# Patient Record
Sex: FEMALE | Race: WHITE | NOT HISPANIC OR LATINO | URBAN - METROPOLITAN AREA
[De-identification: names, ages, dates, MRNs, and addresses within clinical notes are randomized per-mention and may not be internally consistent; named-entity substitution may affect disease eponyms.]

---

## 2017-10-24 ENCOUNTER — APPOINTMENT (OUTPATIENT)
Dept: URBAN - METROPOLITAN AREA CLINIC 200 | Age: 62
Setting detail: DERMATOLOGY
End: 2017-10-26

## 2017-10-24 DIAGNOSIS — L57.8 OTHER SKIN CHANGES DUE TO CHRONIC EXPOSURE TO NONIONIZING RADIATION: ICD-10-CM

## 2017-10-24 DIAGNOSIS — B35.1 TINEA UNGUIUM: ICD-10-CM

## 2017-10-24 DIAGNOSIS — L29.8 OTHER PRURITUS: ICD-10-CM

## 2017-10-24 PROBLEM — J30.1 ALLERGIC RHINITIS DUE TO POLLEN: Status: ACTIVE | Noted: 2017-10-24

## 2017-10-24 PROBLEM — L20.84 INTRINSIC (ALLERGIC) ECZEMA: Status: ACTIVE | Noted: 2017-10-24

## 2017-10-24 PROBLEM — I10 ESSENTIAL (PRIMARY) HYPERTENSION: Status: ACTIVE | Noted: 2017-10-24

## 2017-10-24 PROBLEM — L29.9 PRURITUS, UNSPECIFIED: Status: ACTIVE | Noted: 2017-10-24

## 2017-10-24 PROCEDURE — 99213 OFFICE O/P EST LOW 20 MIN: CPT

## 2017-10-24 PROCEDURE — OTHER COUNSELING: OTHER

## 2017-10-24 PROCEDURE — OTHER PRESCRIPTION SAMPLES PROVIDED: OTHER

## 2017-10-24 PROCEDURE — OTHER PRESCRIPTION: OTHER

## 2017-10-24 RX ORDER — HYDROCORTISONE 25 MG/ML
LOTION TOPICAL
Qty: 1 | Refills: 7 | Status: ERX

## 2017-10-24 ASSESSMENT — LOCATION SIMPLE DESCRIPTION DERM
LOCATION SIMPLE: RIGHT UPPER BACK
LOCATION SIMPLE: LEFT CHEEK
LOCATION SIMPLE: LEFT GREAT TOE
LOCATION SIMPLE: RIGHT GREAT TOE

## 2017-10-24 ASSESSMENT — LOCATION ZONE DERM
LOCATION ZONE: TOE
LOCATION ZONE: TRUNK
LOCATION ZONE: FACE
LOCATION ZONE: TOENAIL

## 2017-10-24 ASSESSMENT — LOCATION DETAILED DESCRIPTION DERM
LOCATION DETAILED: RIGHT GREAT TOENAIL
LOCATION DETAILED: RIGHT INFERIOR MEDIAL UPPER BACK
LOCATION DETAILED: LEFT DISTAL PLANTAR GREAT TOE
LOCATION DETAILED: LEFT INFERIOR CENTRAL MALAR CHEEK

## 2017-11-08 ENCOUNTER — RX ONLY (RX ONLY)
Age: 62
End: 2017-11-08

## 2017-11-08 RX ORDER — EFINACONAZOLE 100 MG/ML
SOLUTION TOPICAL
Qty: 1 | Refills: 1 | Status: ERX | COMMUNITY
Start: 2017-11-08

## 2018-11-13 ENCOUNTER — RX ONLY (RX ONLY)
Age: 63
End: 2018-11-13

## 2018-11-13 ENCOUNTER — APPOINTMENT (OUTPATIENT)
Dept: URBAN - METROPOLITAN AREA CLINIC 200 | Age: 63
Setting detail: DERMATOLOGY
End: 2018-11-15

## 2018-11-13 DIAGNOSIS — L20.84 INTRINSIC (ALLERGIC) ECZEMA: ICD-10-CM

## 2018-11-13 DIAGNOSIS — L57.8 OTHER SKIN CHANGES DUE TO CHRONIC EXPOSURE TO NONIONIZING RADIATION: ICD-10-CM

## 2018-11-13 PROCEDURE — OTHER MIPS QUALITY: OTHER

## 2018-11-13 PROCEDURE — OTHER COUNSELING: OTHER

## 2018-11-13 PROCEDURE — 99213 OFFICE O/P EST LOW 20 MIN: CPT

## 2018-11-13 PROCEDURE — OTHER PRESCRIPTION: OTHER

## 2018-11-13 RX ORDER — EFINACONAZOLE 100 MG/ML
SOLUTION TOPICAL
Qty: 3 | Refills: 1 | Status: ERX

## 2018-11-13 RX ORDER — TRIAMCINOLONE ACETONIDE 1 MG/G
CREAM TOPICAL
Qty: 1 | Refills: 3 | Status: ERX | COMMUNITY
Start: 2018-11-13

## 2018-11-13 ASSESSMENT — LOCATION ZONE DERM: LOCATION ZONE: TRUNK

## 2018-11-13 ASSESSMENT — LOCATION SIMPLE DESCRIPTION DERM
LOCATION SIMPLE: CHEST
LOCATION SIMPLE: RIGHT UPPER BACK

## 2018-11-13 ASSESSMENT — LOCATION DETAILED DESCRIPTION DERM
LOCATION DETAILED: LEFT MEDIAL SUPERIOR CHEST
LOCATION DETAILED: RIGHT SUPERIOR MEDIAL UPPER BACK

## 2019-12-09 ENCOUNTER — APPOINTMENT (OUTPATIENT)
Dept: URBAN - METROPOLITAN AREA CLINIC 200 | Age: 64
Setting detail: DERMATOLOGY
End: 2019-12-11

## 2019-12-09 DIAGNOSIS — Z12.83 ENCOUNTER FOR SCREENING FOR MALIGNANT NEOPLASM OF SKIN: ICD-10-CM

## 2019-12-09 DIAGNOSIS — L57.8 OTHER SKIN CHANGES DUE TO CHRONIC EXPOSURE TO NONIONIZING RADIATION: ICD-10-CM

## 2019-12-09 PROBLEM — I10 ESSENTIAL (PRIMARY) HYPERTENSION: Status: ACTIVE | Noted: 2019-12-09

## 2019-12-09 PROBLEM — L20.84 INTRINSIC (ALLERGIC) ECZEMA: Status: ACTIVE | Noted: 2019-12-09

## 2019-12-09 PROCEDURE — OTHER COUNSELING: OTHER

## 2019-12-09 PROCEDURE — OTHER MIPS QUALITY: OTHER

## 2019-12-09 PROCEDURE — 99213 OFFICE O/P EST LOW 20 MIN: CPT

## 2019-12-09 ASSESSMENT — LOCATION DETAILED DESCRIPTION DERM
LOCATION DETAILED: LEFT MEDIAL SUPERIOR CHEST
LOCATION DETAILED: LEFT MID-UPPER BACK

## 2019-12-09 ASSESSMENT — LOCATION SIMPLE DESCRIPTION DERM
LOCATION SIMPLE: LEFT UPPER BACK
LOCATION SIMPLE: CHEST

## 2019-12-09 ASSESSMENT — LOCATION ZONE DERM
LOCATION ZONE: TRUNK
LOCATION ZONE: TRUNK

## 2019-12-09 NOTE — PROCEDURE: MIPS QUALITY
Quality 474: Zoster Vaccination Status: Shingrix vaccine was not administered for reasons documented by clinician (e.g. patient administered vaccine other than Shingrix, patient allergy or other medical reasons, patient declined or other patient reasons, vaccine not available or other system reasons)
Detail Level: Detailed
Additional Notes: Shingles SHINGRIX vaccine not administered due to patients personal or medical reasons. Patient declined to elaborate on those reasons.
Quality 110: Preventive Care And Screening: Influenza Immunization: Influenza Immunization previously received during influenza season

## 2020-09-04 ENCOUNTER — HOSPITAL ENCOUNTER (EMERGENCY)
Facility: HOSPITAL | Age: 65
Discharge: HOME | End: 2020-09-04
Attending: EMERGENCY MEDICINE
Payer: COMMERCIAL

## 2020-09-04 VITALS
OXYGEN SATURATION: 96 % | HEART RATE: 104 BPM | TEMPERATURE: 98.4 F | SYSTOLIC BLOOD PRESSURE: 145 MMHG | DIASTOLIC BLOOD PRESSURE: 65 MMHG | RESPIRATION RATE: 18 BRPM | WEIGHT: 143 LBS | BODY MASS INDEX: 26.31 KG/M2 | HEIGHT: 62 IN

## 2020-09-04 DIAGNOSIS — M27.2 ODONTOGENIC INFECTION OF JAW: Primary | ICD-10-CM

## 2020-09-04 LAB
ALBUMIN SERPL-MCNC: 4.1 G/DL (ref 3.4–5)
ALP SERPL-CCNC: 50 IU/L (ref 35–126)
ALT SERPL-CCNC: 14 IU/L (ref 11–54)
ANION GAP SERPL CALC-SCNC: 10 MEQ/L (ref 3–15)
AST SERPL-CCNC: 18 IU/L (ref 15–41)
BASOPHILS # BLD: 0.02 K/UL (ref 0.01–0.1)
BASOPHILS NFR BLD: 0.4 %
BILIRUB SERPL-MCNC: 0.8 MG/DL (ref 0.3–1.2)
BUN SERPL-MCNC: 18 MG/DL (ref 8–20)
CALCIUM SERPL-MCNC: 9.4 MG/DL (ref 8.9–10.3)
CHLORIDE SERPL-SCNC: 106 MEQ/L (ref 98–109)
CO2 SERPL-SCNC: 24 MEQ/L (ref 22–32)
CREAT SERPL-MCNC: 0.7 MG/DL (ref 0.6–1.1)
DIFFERENTIAL METHOD BLD: ABNORMAL
EOSINOPHIL # BLD: 0.03 K/UL (ref 0.04–0.36)
EOSINOPHIL NFR BLD: 0.5 %
ERYTHROCYTE [DISTWIDTH] IN BLOOD BY AUTOMATED COUNT: 13.6 % (ref 11.7–14.4)
GFR SERPL CREATININE-BSD FRML MDRD: >60 ML/MIN/1.73M*2
GLUCOSE SERPL-MCNC: 109 MG/DL (ref 70–99)
HCT VFR BLDCO AUTO: 39.4 % (ref 35–45)
HGB BLD-MCNC: 12.5 G/DL (ref 11.8–15.7)
IMM GRANULOCYTES # BLD AUTO: 0.03 K/UL (ref 0–0.08)
IMM GRANULOCYTES NFR BLD AUTO: 0.5 %
LYMPHOCYTES # BLD: 1.08 K/UL (ref 1.2–3.5)
LYMPHOCYTES NFR BLD: 19.4 %
MCH RBC QN AUTO: 29.3 PG (ref 28–33.2)
MCHC RBC AUTO-ENTMCNC: 31.7 G/DL (ref 32.2–35.5)
MCV RBC AUTO: 92.3 FL (ref 83–98)
MONOCYTES # BLD: 0.41 K/UL (ref 0.28–0.8)
MONOCYTES NFR BLD: 7.4 %
NEUTROPHILS # BLD: 3.99 K/UL (ref 1.7–7)
NEUTS SEG NFR BLD: 71.8 %
NRBC BLD-RTO: 0 %
PDW BLD AUTO: 11.2 FL (ref 9.4–12.3)
PLATELET # BLD AUTO: 184 K/UL (ref 150–369)
POTASSIUM SERPL-SCNC: 3.8 MEQ/L (ref 3.6–5.1)
PROT SERPL-MCNC: 6.8 G/DL (ref 6–8.2)
RBC # BLD AUTO: 4.27 M/UL (ref 3.93–5.22)
SODIUM SERPL-SCNC: 140 MEQ/L (ref 136–144)
WBC # BLD AUTO: 5.56 K/UL (ref 3.8–10.5)

## 2020-09-04 PROCEDURE — 63600000 HC DRUGS/DETAIL CODE: Performed by: EMERGENCY MEDICINE

## 2020-09-04 PROCEDURE — 85025 COMPLETE CBC W/AUTO DIFF WBC: CPT | Performed by: EMERGENCY MEDICINE

## 2020-09-04 PROCEDURE — 3E03329 INTRODUCTION OF OTHER ANTI-INFECTIVE INTO PERIPHERAL VEIN, PERCUTANEOUS APPROACH: ICD-10-PCS | Performed by: EMERGENCY MEDICINE

## 2020-09-04 PROCEDURE — 96365 THER/PROPH/DIAG IV INF INIT: CPT

## 2020-09-04 PROCEDURE — 36415 COLL VENOUS BLD VENIPUNCTURE: CPT | Performed by: EMERGENCY MEDICINE

## 2020-09-04 PROCEDURE — 96375 TX/PRO/DX INJ NEW DRUG ADDON: CPT

## 2020-09-04 PROCEDURE — 80053 COMPREHEN METABOLIC PANEL: CPT | Performed by: EMERGENCY MEDICINE

## 2020-09-04 PROCEDURE — 99284 EMERGENCY DEPT VISIT MOD MDM: CPT

## 2020-09-04 PROCEDURE — 3E0333Z INTRODUCTION OF ANTI-INFLAMMATORY INTO PERIPHERAL VEIN, PERCUTANEOUS APPROACH: ICD-10-PCS | Performed by: EMERGENCY MEDICINE

## 2020-09-04 PROCEDURE — 25800000 HC PHARMACY IV SOLUTIONS: Performed by: EMERGENCY MEDICINE

## 2020-09-04 RX ORDER — ETODOLAC 400 MG/1
TABLET, FILM COATED ORAL
COMMUNITY
Start: 2020-09-02

## 2020-09-04 RX ORDER — SIMVASTATIN 10 MG/1
TABLET, FILM COATED ORAL
COMMUNITY
Start: 2014-05-28

## 2020-09-04 RX ORDER — AMOXICILLIN 500 MG/1
CAPSULE ORAL
COMMUNITY
Start: 2020-09-02

## 2020-09-04 RX ORDER — KETOROLAC TROMETHAMINE 15 MG/ML
15 INJECTION, SOLUTION INTRAMUSCULAR; INTRAVENOUS ONCE
Status: COMPLETED | OUTPATIENT
Start: 2020-09-04 | End: 2020-09-04

## 2020-09-04 RX ADMIN — KETOROLAC TROMETHAMINE 15 MG: 15 INJECTION, SOLUTION INTRAMUSCULAR; INTRAVENOUS at 06:16

## 2020-09-04 RX ADMIN — SODIUM CHLORIDE 3 G: 900 INJECTION INTRAVENOUS at 05:29

## 2020-09-04 ASSESSMENT — ENCOUNTER SYMPTOMS
EYE DISCHARGE: 0
VOICE CHANGE: 0
COUGH: 0
CONSTITUTIONAL NEGATIVE: 1
EYES NEGATIVE: 1
CARDIOVASCULAR NEGATIVE: 1
RESPIRATORY NEGATIVE: 1
GASTROINTESTINAL NEGATIVE: 1
ACTIVITY CHANGE: 0

## 2020-09-04 NOTE — ED PROVIDER NOTES
"HPI     Chief Complaint   Patient presents with   • Dental Pain       Patient is a 65-year-old white female who comes in with swelling in the right jaw area and some pain as well.  She had a root canal done 2 days ago and is on amoxicillin and a nonsteroidal drug for pain.  Today she had some shaking chills at least what sounds like shaking chills woke her up from sleep.  She did not have a fever and she developed the swelling that has continued to get bigger and bigger that she can see in the outside of her jaw.  She can open her mouth normally she can swallow normally she can talk normally.  Pain she says is about a 5 out of 10.  She has not had any pus drainage.           Patient History     Past Medical History:   Diagnosis Date   • Lipid disorder    • Vasovagal syncope        History reviewed. No pertinent surgical history.    History reviewed. No pertinent family history.    Social History     Tobacco Use   • Smoking status: Former Smoker   • Smokeless tobacco: Never Used   Substance Use Topics   • Alcohol use: Not Currently   • Drug use: Never       Systems Reviewed from Nursing Triage:          Review of Systems     Review of Systems   Constitutional: Negative.  Negative for activity change.   HENT: Positive for dental problem. Negative for drooling and voice change.         Swollen area right side at the angle of the jaw.   Eyes: Negative.  Negative for discharge.   Respiratory: Negative.  Negative for cough.    Cardiovascular: Negative.    Gastrointestinal: Negative.         Physical Exam     ED Triage Vitals [09/04/20 0455]   Temp Heart Rate Resp BP SpO2   36.9 °C (98.4 °F) (!) 104 -- (!) 173/78 96 %      Temp Source Heart Rate Source Patient Position BP Location FiO2 (%) (Set)   Temporal -- -- -- --                     Patient Vitals for the past 24 hrs:   BP Temp Temp src Pulse SpO2 Height Weight   09/04/20 0455 (!) 173/78 36.9 °C (98.4 °F) Temporal (!) 104 96 % 1.575 m (5' 2\") 64.9 kg (143 lb)        "                                   Physical Exam   Constitutional: She is oriented to person, place, and time. She appears well-developed and well-nourished.   HENT:   Head: Normocephalic and atraumatic.   Swelling on the right side of the cheek near the angle of the jaw.  Inside of the mouth 1 can see the second molar with drill on top of the temporary crown.  The tooth itself is not tender nor is the base of the tooth in the jaw area however in the cheek and the reflection the area is swollen.  There is no purulence that is felt.  Simply swelling and tenderness.  Slight warmth but no redness.   Eyes: Pupils are equal, round, and reactive to light. EOM are normal.   Neck: Normal range of motion. Neck supple.   Cardiovascular: Normal rate, regular rhythm, normal heart sounds and intact distal pulses.   Pulmonary/Chest: Effort normal and breath sounds normal.   Neurological: She is alert and oriented to person, place, and time.   Skin: Skin is warm and dry. Capillary refill takes less than 2 seconds.   Nursing note and vitals reviewed.  Media Information             Procedures    Labs Reviewed   CBC AND DIFF   COMPREHENSIVE METABOLIC PANEL   RAINBOW DRAW PANEL    Narrative:     The following orders were created for panel order [facility] Draw Panel.  Procedure                               Abnormality         Status                     ---------                               -----------         ------                     RAINBOW RED[[id number]]                                                                 RAINBOW LT BLUE[[id number]]                                                             RAINBOW LT GREEN[[id number]]                                                            RAINBOW GOLD[[id number]]                                                                  Please view results for these tests on the individual orders.   RAINBOW RED   RAINBOW LT BLUE   RAINBOW LT GREEN   RAINBOW GOLD       No orders to display                ED Course & MDM     MDM         ED Course as of Sep 04 0643   Fri Sep 04, 2020   0520 Discussed with Dr. Turner who is on-call for Dr. Man, who is the one that did the root canal.  Agrees that patient can be seen this morning in their office. ([place]  [phone])  Okay to give a dose of IV Unasyn here.  We will also send off some labs.    [MS]   0611 Labs are all normal.  Has received a dose of IV Unasyn.  We will go ahead and give a dose of IV Toradol before she leaves.    [MS]      ED Course User Index  [MS] Stuart Lopez MD         Clinical Impressions as of Sep 04 0643   Odontogenic infection of jaw        Stuart Lopez MD  09/04/20 0523       Stuart Lopez MD  09/04/20 0643

## 2020-09-04 NOTE — DISCHARGE INSTRUCTIONS
Call the [facility] office at 7:30 A for time to be seen.  You've received a dose of unasyn (antibiotic) and ketorolac (an NSAID for pain) here.  Apply warm compresses to the area that is swollen.

## 2021-04-20 ENCOUNTER — APPOINTMENT (OUTPATIENT)
Dept: URBAN - METROPOLITAN AREA CLINIC 200 | Age: 66
Setting detail: DERMATOLOGY
End: 2021-04-25

## 2021-04-20 DIAGNOSIS — L20.84 INTRINSIC (ALLERGIC) ECZEMA: ICD-10-CM

## 2021-04-20 DIAGNOSIS — L82.1 OTHER SEBORRHEIC KERATOSIS: ICD-10-CM

## 2021-04-20 DIAGNOSIS — L57.8 OTHER SKIN CHANGES DUE TO CHRONIC EXPOSURE TO NONIONIZING RADIATION: ICD-10-CM

## 2021-04-20 PROBLEM — J30.1 ALLERGIC RHINITIS DUE TO POLLEN: Status: ACTIVE | Noted: 2021-04-20

## 2021-04-20 PROCEDURE — OTHER PRESCRIPTION SAMPLES PROVIDED: OTHER

## 2021-04-20 PROCEDURE — OTHER REASSURANCE: OTHER

## 2021-04-20 PROCEDURE — OTHER COUNSELING: OTHER

## 2021-04-20 PROCEDURE — 99213 OFFICE O/P EST LOW 20 MIN: CPT

## 2021-04-20 ASSESSMENT — LOCATION ZONE DERM
LOCATION ZONE: TRUNK
LOCATION ZONE: ARM

## 2021-04-20 ASSESSMENT — LOCATION SIMPLE DESCRIPTION DERM
LOCATION SIMPLE: LEFT FOREARM
LOCATION SIMPLE: CHEST

## 2021-04-20 ASSESSMENT — LOCATION DETAILED DESCRIPTION DERM
LOCATION DETAILED: RIGHT LATERAL SUPERIOR CHEST
LOCATION DETAILED: LEFT VENTRAL DISTAL FOREARM
LOCATION DETAILED: LEFT MEDIAL SUPERIOR CHEST

## 2022-04-21 ENCOUNTER — APPOINTMENT (OUTPATIENT)
Dept: URBAN - METROPOLITAN AREA CLINIC 200 | Age: 67
Setting detail: DERMATOLOGY
End: 2022-04-28

## 2022-04-21 DIAGNOSIS — D18.0 HEMANGIOMA: ICD-10-CM

## 2022-04-21 DIAGNOSIS — L57.8 OTHER SKIN CHANGES DUE TO CHRONIC EXPOSURE TO NONIONIZING RADIATION: ICD-10-CM

## 2022-04-21 DIAGNOSIS — Z11.52 ENCOUNTER FOR SCREENING FOR COVID-19: ICD-10-CM

## 2022-04-21 DIAGNOSIS — B35.1 TINEA UNGUIUM: ICD-10-CM

## 2022-04-21 PROBLEM — D18.01 HEMANGIOMA OF SKIN AND SUBCUTANEOUS TISSUE: Status: ACTIVE | Noted: 2022-04-21

## 2022-04-21 PROBLEM — L20.84 INTRINSIC (ALLERGIC) ECZEMA: Status: ACTIVE | Noted: 2022-04-21

## 2022-04-21 PROCEDURE — 99213 OFFICE O/P EST LOW 20 MIN: CPT

## 2022-04-21 PROCEDURE — OTHER SCREENING FOR COVID-19: OTHER

## 2022-04-21 PROCEDURE — OTHER REASSURANCE: OTHER

## 2022-04-21 PROCEDURE — OTHER PRESCRIPTION MEDICATION MANAGEMENT: OTHER

## 2022-04-21 PROCEDURE — OTHER SUNSCREEN RECOMMENDATIONS: OTHER

## 2022-04-21 PROCEDURE — OTHER ORDER TESTS: OTHER

## 2022-04-21 PROCEDURE — OTHER COUNSELING: OTHER

## 2022-04-21 ASSESSMENT — LOCATION SIMPLE DESCRIPTION DERM
LOCATION SIMPLE: ABDOMEN
LOCATION SIMPLE: LEFT LIP
LOCATION SIMPLE: LEFT GREAT TOE

## 2022-04-21 ASSESSMENT — LOCATION ZONE DERM
LOCATION ZONE: TOENAIL
LOCATION ZONE: TRUNK
LOCATION ZONE: LIP

## 2022-04-21 ASSESSMENT — LOCATION DETAILED DESCRIPTION DERM
LOCATION DETAILED: LEFT SUPERIOR VERMILION LIP
LOCATION DETAILED: LEFT GREAT TOENAIL
LOCATION DETAILED: PERIUMBILICAL SKIN

## 2022-04-21 NOTE — PROCEDURE: PRESCRIPTION MEDICATION MANAGEMENT
Plan: Pt is recovering laser therapy and was prescribed jubila previously
Detail Level: Detailed
Render In Strict Bullet Format?: No

## 2023-06-14 ENCOUNTER — APPOINTMENT (OUTPATIENT)
Dept: URBAN - METROPOLITAN AREA CLINIC 203 | Age: 68
Setting detail: DERMATOLOGY
End: 2023-06-18

## 2023-06-14 DIAGNOSIS — I83.9 ASYMPTOMATIC VARICOSE VEINS OF LOWER EXTREMITIES: ICD-10-CM

## 2023-06-14 DIAGNOSIS — L81.4 OTHER MELANIN HYPERPIGMENTATION: ICD-10-CM

## 2023-06-14 DIAGNOSIS — L82.1 OTHER SEBORRHEIC KERATOSIS: ICD-10-CM

## 2023-06-14 DIAGNOSIS — L98.8 OTHER SPECIFIED DISORDERS OF THE SKIN AND SUBCUTANEOUS TISSUE: ICD-10-CM

## 2023-06-14 DIAGNOSIS — L91.8 OTHER HYPERTROPHIC DISORDERS OF THE SKIN: ICD-10-CM

## 2023-06-14 DIAGNOSIS — B07.8 OTHER VIRAL WARTS: ICD-10-CM

## 2023-06-14 DIAGNOSIS — L57.8 OTHER SKIN CHANGES DUE TO CHRONIC EXPOSURE TO NONIONIZING RADIATION: ICD-10-CM

## 2023-06-14 PROBLEM — I83.93 ASYMPTOMATIC VARICOSE VEINS OF BILATERAL LOWER EXTREMITIES: Status: ACTIVE | Noted: 2023-06-14

## 2023-06-14 PROCEDURE — OTHER COUNSELING: OTHER

## 2023-06-14 PROCEDURE — OTHER SUNSCREEN RECOMMENDATIONS: OTHER

## 2023-06-14 PROCEDURE — OTHER REASSURANCE: OTHER

## 2023-06-14 PROCEDURE — 99213 OFFICE O/P EST LOW 20 MIN: CPT

## 2023-06-14 ASSESSMENT — LOCATION DETAILED DESCRIPTION DERM
LOCATION DETAILED: LEFT SUPERIOR VERMILION LIP
LOCATION DETAILED: MIDDLE STERNUM
LOCATION DETAILED: LEFT MEDIAL BREAST 11-12:00 REGION
LOCATION DETAILED: LEFT SUPERIOR LATERAL BUCCAL CHEEK
LOCATION DETAILED: RIGHT DISTAL POSTERIOR THIGH
LOCATION DETAILED: LEFT DISTAL POSTERIOR THIGH
LOCATION DETAILED: RIGHT AXILLARY VAULT
LOCATION DETAILED: LEFT MEDIAL BREAST 10-11:00 REGION

## 2023-06-14 ASSESSMENT — PAIN INTENSITY VAS: HOW INTENSE IS YOUR PAIN 0 BEING NO PAIN, 10 BEING THE MOST SEVERE PAIN POSSIBLE?: NO PAIN

## 2023-06-14 ASSESSMENT — LOCATION SIMPLE DESCRIPTION DERM
LOCATION SIMPLE: LEFT BREAST
LOCATION SIMPLE: RIGHT AXILLARY VAULT
LOCATION SIMPLE: CHEST
LOCATION SIMPLE: LEFT CHEEK
LOCATION SIMPLE: RIGHT POSTERIOR THIGH
LOCATION SIMPLE: LEFT POSTERIOR THIGH
LOCATION SIMPLE: LEFT LIP

## 2023-06-14 ASSESSMENT — LOCATION ZONE DERM
LOCATION ZONE: AXILLAE
LOCATION ZONE: FACE
LOCATION ZONE: LIP
LOCATION ZONE: TRUNK
LOCATION ZONE: LEG

## 2024-02-09 ENCOUNTER — TRANSCRIBE ORDERS (OUTPATIENT)
Dept: SCHEDULING | Age: 69
End: 2024-02-09

## 2024-02-09 DIAGNOSIS — Z12.31 ENCOUNTER FOR SCREENING MAMMOGRAM FOR MALIGNANT NEOPLASM OF BREAST: Primary | ICD-10-CM

## 2024-04-01 ENCOUNTER — HOSPITAL ENCOUNTER (OUTPATIENT)
Dept: RADIOLOGY | Facility: HOSPITAL | Age: 69
Discharge: HOME | End: 2024-04-01
Attending: OBSTETRICS & GYNECOLOGY
Payer: MEDICARE

## 2024-04-01 DIAGNOSIS — Z12.31 ENCOUNTER FOR SCREENING MAMMOGRAM FOR MALIGNANT NEOPLASM OF BREAST: ICD-10-CM

## 2024-04-01 PROCEDURE — 77063 BREAST TOMOSYNTHESIS BI: CPT

## 2024-04-03 ENCOUNTER — APPOINTMENT (OUTPATIENT)
Dept: URBAN - METROPOLITAN AREA CLINIC 203 | Age: 69
Setting detail: DERMATOLOGY
End: 2024-04-03

## 2024-04-03 DIAGNOSIS — L57.8 OTHER SKIN CHANGES DUE TO CHRONIC EXPOSURE TO NONIONIZING RADIATION: ICD-10-CM

## 2024-04-03 DIAGNOSIS — L738 OTHER SPECIFIED DISEASES OF HAIR AND HAIR FOLLICLES: ICD-10-CM

## 2024-04-03 DIAGNOSIS — L663 OTHER SPECIFIED DISEASES OF HAIR AND HAIR FOLLICLES: ICD-10-CM

## 2024-04-03 DIAGNOSIS — R20.2 PARESTHESIA OF SKIN: ICD-10-CM

## 2024-04-03 PROBLEM — L02.12 FURUNCLE OF NECK: Status: ACTIVE | Noted: 2024-04-03

## 2024-04-03 PROCEDURE — OTHER COUNSELING: OTHER

## 2024-04-03 PROCEDURE — OTHER PRESCRIPTION: OTHER

## 2024-04-03 PROCEDURE — OTHER SUNSCREEN RECOMMENDATIONS: OTHER

## 2024-04-03 PROCEDURE — 99213 OFFICE O/P EST LOW 20 MIN: CPT

## 2024-04-03 PROCEDURE — OTHER PRESCRIPTION MEDICATION MANAGEMENT: OTHER

## 2024-04-03 RX ORDER — TRIAMCINOLONE ACETONIDE 1 MG/G
CREAM TOPICAL BID PRN
Qty: 453.6 | Refills: 11 | Status: ERX | COMMUNITY
Start: 2024-04-03

## 2024-04-03 ASSESSMENT — LOCATION DETAILED DESCRIPTION DERM
LOCATION DETAILED: MID POSTERIOR NECK
LOCATION DETAILED: LEFT MEDIAL BREAST 11-12:00 REGION
LOCATION DETAILED: LEFT MEDIAL BREAST 10-11:00 REGION
LOCATION DETAILED: MID TRAPEZIAL NECK

## 2024-04-03 ASSESSMENT — LOCATION SIMPLE DESCRIPTION DERM
LOCATION SIMPLE: POSTERIOR NECK
LOCATION SIMPLE: TRAPEZIAL NECK
LOCATION SIMPLE: LEFT BREAST

## 2024-04-03 ASSESSMENT — LOCATION ZONE DERM
LOCATION ZONE: TRUNK
LOCATION ZONE: NECK

## 2024-04-03 NOTE — PROCEDURE: PRESCRIPTION MEDICATION MANAGEMENT
Plan: Angélica
Detail Level: Zone
Continue Regimen: Triamcinolone BID
Render In Strict Bullet Format?: No
Samples Given: Dhs salicylic acid shampoo qd

## 2025-06-30 ENCOUNTER — HOSPITAL ENCOUNTER (OUTPATIENT)
Dept: RADIOLOGY | Facility: HOSPITAL | Age: 70
Discharge: HOME | End: 2025-06-30
Attending: OBSTETRICS & GYNECOLOGY
Payer: MEDICARE

## 2025-06-30 DIAGNOSIS — Z12.31 OTHER SCREENING MAMMOGRAM: ICD-10-CM

## 2025-06-30 PROCEDURE — 77063 BREAST TOMOSYNTHESIS BI: CPT

## 2025-07-08 ENCOUNTER — TELEPHONE (OUTPATIENT)
Facility: HOSPITAL | Age: 70
End: 2025-07-08
Payer: MEDICARE

## 2025-07-08 DIAGNOSIS — M81.0 AGE RELATED OSTEOPOROSIS, UNSPECIFIED PATHOLOGICAL FRACTURE PRESENCE: ICD-10-CM

## 2025-07-08 DIAGNOSIS — E55.9 VITAMIN D DEFICIENCY: Primary | ICD-10-CM

## 2025-07-08 NOTE — TELEPHONE ENCOUNTER
Pt has a npv on 7/30 pt is asking if labs can be added before her appt. Previous pt of Dr Raya following for osteoporosis and prolia injection

## 2025-07-09 PROBLEM — K21.9 GERD (GASTROESOPHAGEAL REFLUX DISEASE): Status: ACTIVE | Noted: 2023-05-22

## 2025-07-09 PROBLEM — M81.0 OSTEOPOROSIS: Status: ACTIVE | Noted: 2018-01-02

## 2025-07-09 PROBLEM — E55.9 VITAMIN D DEFICIENCY: Status: ACTIVE | Noted: 2024-06-27

## 2025-07-09 RX ORDER — DENOSUMAB 60 MG/ML
60 INJECTION SUBCUTANEOUS
COMMUNITY
Start: 2023-06-05

## 2025-07-09 RX ORDER — CHOLECALCIFEROL (VITAMIN D3) 25 MCG
1000 TABLET ORAL DAILY
COMMUNITY

## 2025-07-09 NOTE — TELEPHONE ENCOUNTER
Spoke to pt  She had labs at [facility] for cardiology  Printed to scan to chart, will need updated labs for prolia though including vit D  Ordered and will mail to pt  Haylee Richards LPN

## 2025-07-15 LAB
25(OH)D3+25(OH)D2 SERPL-MCNC: 57 NG/ML (ref 30–100)
ALBUMIN SERPL-MCNC: 4.4 G/DL (ref 3.6–5.1)
ALBUMIN/GLOB SERPL: 1.8 (CALC) (ref 1–2.5)
ALP SERPL-CCNC: 48 U/L (ref 37–153)
ALT SERPL-CCNC: 8 U/L (ref 6–29)
AST SERPL-CCNC: 14 U/L (ref 10–35)
BILIRUB SERPL-MCNC: 0.5 MG/DL (ref 0.2–1.2)
BUN SERPL-MCNC: 16 MG/DL (ref 7–25)
BUN/CREAT SERPL: NORMAL (CALC) (ref 6–22)
CALCIUM SERPL-MCNC: 9.6 MG/DL (ref 8.6–10.4)
CHLORIDE SERPL-SCNC: 104 MMOL/L (ref 98–110)
CO2 SERPL-SCNC: 29 MMOL/L (ref 20–32)
CREAT SERPL-MCNC: 0.78 MG/DL (ref 0.6–1)
EGFRCR SERPLBLD CKD-EPI 2021: 82 ML/MIN/1.73M2
GLOBULIN SER CALC-MCNC: 2.5 G/DL (CALC) (ref 1.9–3.7)
GLUCOSE SERPL-MCNC: 86 MG/DL (ref 65–99)
POTASSIUM SERPL-SCNC: 4.3 MMOL/L (ref 3.5–5.3)
PROT SERPL-MCNC: 6.9 G/DL (ref 6.1–8.1)
SODIUM SERPL-SCNC: 140 MMOL/L (ref 135–146)

## 2025-07-30 ENCOUNTER — OFFICE VISIT (OUTPATIENT)
Facility: HOSPITAL | Age: 70
End: 2025-07-30
Payer: MEDICARE

## 2025-07-30 ENCOUNTER — TELEPHONE (OUTPATIENT)
Facility: HOSPITAL | Age: 70
End: 2025-07-30

## 2025-07-30 VITALS
OXYGEN SATURATION: 98 % | WEIGHT: 146 LBS | BODY MASS INDEX: 27.56 KG/M2 | SYSTOLIC BLOOD PRESSURE: 124 MMHG | HEIGHT: 61 IN | DIASTOLIC BLOOD PRESSURE: 80 MMHG

## 2025-07-30 DIAGNOSIS — E78.2 MIXED HYPERLIPIDEMIA: ICD-10-CM

## 2025-07-30 DIAGNOSIS — M81.0 AGE-RELATED OSTEOPOROSIS WITHOUT CURRENT PATHOLOGICAL FRACTURE: Primary | ICD-10-CM

## 2025-07-30 DIAGNOSIS — E55.9 VITAMIN D DEFICIENCY: ICD-10-CM

## 2025-07-30 PROCEDURE — 99204 OFFICE O/P NEW MOD 45 MIN: CPT | Performed by: INTERNAL MEDICINE

## 2025-07-30 PROCEDURE — G0463 HOSPITAL OUTPT CLINIC VISIT: HCPCS | Performed by: INTERNAL MEDICINE

## 2025-07-30 PROCEDURE — G2211 COMPLEX E/M VISIT ADD ON: HCPCS | Performed by: INTERNAL MEDICINE

## 2025-07-30 RX ORDER — ALBUTEROL SULFATE 0.83 MG/ML
2.5 SOLUTION RESPIRATORY (INHALATION) ONCE AS NEEDED
OUTPATIENT
Start: 2025-08-06

## 2025-07-30 RX ORDER — FAMOTIDINE 10 MG/ML
20 INJECTION, SOLUTION INTRAVENOUS ONCE AS NEEDED
OUTPATIENT
Start: 2025-08-06

## 2025-07-30 RX ORDER — DIPHENHYDRAMINE HCL 50 MG/ML
25 VIAL (ML) INJECTION ONCE AS NEEDED
OUTPATIENT
Start: 2025-08-06

## 2025-07-30 RX ORDER — SODIUM CHLORIDE 9 MG/ML
500 INJECTION, SOLUTION INTRAVENOUS ONCE AS NEEDED
OUTPATIENT
Start: 2025-08-06

## 2025-07-30 ASSESSMENT — ENCOUNTER SYMPTOMS
SHORTNESS OF BREATH: 0
DIARRHEA: 0
ARTHRALGIAS: 0
FATIGUE: 0
VOMITING: 0
TROUBLE SWALLOWING: 0
NAUSEA: 0
JOINT SWELLING: 0

## 2025-07-30 NOTE — ASSESSMENT & PLAN NOTE
This is a 70 y.o. female with a past medical history of osteoporosis   Was seeing dr Raya  Last prolia was Jan 14th 2025 in our office  Was on actonel about 8 yrs or so prior. Wa son monthly and then had GI issues and could no longer tolerate plus dexa was getting worse  There was improvement in spine  with prolia  Is on 2k Vitamin D and is on calcium citrate  No fracture  Balance has been ok overall  Likes to ski    Needs prolia ASAP - will ge tit through [facility]  Going away August 10th  Continue calcium and Vitamin D  - labs are great, levels 57  Wt bearing exercsie    Decision making for this patient was of moderate complexity   I certify that this encounter meets criteria for , as I serve as the focal point for all needed services, or am treating the patient longitudinally for one or more of the patient's chronic or serious health condition(s).         Orders:    Basic metabolic panel; Future

## 2025-07-30 NOTE — PROGRESS NOTES
[facility] Endocrinology  History & Physical        CHIEF COMPLAINT   Patient Name: Isabell Mansfield  MR#: [id number]  : 1955  Consultant: Oleg Law DO     HISTORY OF PRESENT ILLNESS      This is a 70 y.o. female with a past medical history of osteoporosis   Was seeing dr Raay  Last prolia was 2025 in our office  Was on actonel about 8 yrs or so prior. Wa son monthly and then had GI issues and could no longer tolerate plus dexa was getting worse  There was improvement in spine  with prolia  Is on 2k Vitamin D and is on calcium citrate  No fracture  Balance has been ok overall  Likes to ski            PAST MEDICAL AND SURGICAL HISTORY      PMHx:  Past Medical History:   Diagnosis Date    Lipid disorder     Vasovagal syncope         PSHx:  No past surgical history on file.    MEDICATIONS          Current Outpatient Medications:     cholecalciferol, vitamin D3, 1,000 unit (25 mcg) tablet, Take 1,000 Units by mouth daily., Disp: , Rfl:     denosumab (PROLIA) 60 mg/mL syringe, Inject 60 mg under the skin every 6 (six) months., Disp: , Rfl:     amoxicillin (AMOXIL) 500 mg capsule, , Disp: , Rfl:     etodolac (LODINE) 400 mg tablet, , Disp: , Rfl:     simvastatin (ZOCOR) 10 mg tablet, Take by mouth., Disp: , Rfl:     ALLERGIES        Patient has no known allergies.    FAMILY HISTORY        No family history on file.    SOCIAL HISTORY        Social History     Socioeconomic History    Marital status:      Spouse name: None    Number of children: None    Years of education: None    Highest education level: None   Tobacco Use    Smoking status: Former    Smokeless tobacco: Never   Substance and Sexual Activity    Alcohol use: Not Currently    Drug use: Never       REVIEW OF SYSTEMS        Review of Systems   Constitutional:  Negative for fatigue.   HENT:  Negative for trouble swallowing.    Respiratory:  Negative for shortness of breath.    Cardiovascular:  Negative for chest pain and leg  "swelling.   Gastrointestinal:  Negative for diarrhea, nausea and vomiting.   Endocrine: Negative for cold intolerance and heat intolerance.   Musculoskeletal:  Negative for arthralgias and joint swelling.       PHYSICAL EXAMINATION        Visit Vitals  /80 (BP Location: Left upper arm)   Ht 1.549 m (5' 1\")   Wt 66.2 kg (146 lb)   SpO2 98%   BMI 27.59 kg/m²     Body mass index is 27.59 kg/m².    Physical Exam  Vitals and nursing note reviewed.   Constitutional:       Appearance: Normal appearance.   Cardiovascular:      Rate and Rhythm: Normal rate and regular rhythm.   Pulmonary:      Effort: Pulmonary effort is normal.      Breath sounds: Normal breath sounds.   Abdominal:      General: Bowel sounds are normal.      Palpations: Abdomen is soft.   Musculoskeletal:         General: Normal range of motion.      Cervical back: Normal range of motion. No tenderness.   Skin:     General: Skin is warm and dry.   Neurological:      General: No focal deficit present.      Mental Status: She is alert and oriented to person, place, and time.         LABS / IMAGING / STUDIES        Labs:    Lab Results   Component Value Date    GLUCOSE 86 07/14/2025    CREATININE 0.78 07/14/2025    CREATININE 0.7 09/04/2020    CREATININE 0.8 01/02/2018    BUN 16 07/14/2025    K 4.3 07/14/2025     07/14/2025    CALCIUM 9.6 07/14/2025    AST 14 07/14/2025    ALT 8 07/14/2025    ALKPHOS 48 07/14/2025    TRIG 84 07/10/2016    HDL 43 (L) 07/10/2016        No results found for: \"TSH\"    Imaging:      SCREENING                ASSESSMENT AND PLAN     Please bring list of current medications and any insurance information and a copayment if required by your insurance company.       Assessment & Plan  Age-related osteoporosis without current pathological fracture  This is a 70 y.o. female with a past medical history of osteoporosis   Was seeing dr Raya  Last prolia was Jan 14th 2025 in our office  Was on actonel about 8 yrs or so prior. Wa " son monthly and then had GI issues and could no longer tolerate plus dexa was getting worse  There was improvement in spine  with prolia  Is on 2k Vitamin D and is on calcium citrate  No fracture  Balance has been ok overall  Likes to ski    Needs prolia ASAP - will ge tit through [facility]  Going away August 10th  Continue calcium and Vitamin D  - labs are great, levels 57  Wt bearing exercsie    Decision making for this patient was of moderate complexity   I certify that this encounter meets criteria for , as I serve as the focal point for all needed services, or am treating the patient longitudinally for one or more of the patient's chronic or serious health condition(s).         Orders:    Basic metabolic panel; Future    Vitamin D deficiency    Orders:    Vitamin D 25 hydroxy; Future    Mixed hyperlipidemia              Oleg Law,   07/30/25  1:36 PM

## 2025-07-31 NOTE — TELEPHONE ENCOUNTER
Good evening,    Another prolia patient for sumit swartz.   Last shot was January 14th so is due pretty soon  Also going on vacation August 10th  Labs already done and prolia is ordered    Thanks, cb

## 2025-08-05 ENCOUNTER — TELEPHONE (OUTPATIENT)
Facility: HOSPITAL | Age: 70
End: 2025-08-05
Payer: MEDICARE

## 2025-08-05 NOTE — TELEPHONE ENCOUNTER
Patient was told that she would be contacted for an injection or prolia. No one has contacted her and she is going away Aug 10. Wanted to know who is supposed to set up the shot.  [place] [facility] [facility] CLINICAL SUPPORT P   PSR Pool: ENDO [facility] PSR P

## 2025-08-05 NOTE — TELEPHONE ENCOUNTER
RN spoke with patient regarding scheduling her Prolia Infusion (Prolia order was in chart) gave patient Infusion Center phone number for [place].

## 2025-08-08 ENCOUNTER — HOSPITAL ENCOUNTER (OUTPATIENT)
Dept: HEMATOLOGY/ONCOLOGY | Facility: HOSPITAL | Age: 70
Setting detail: INFUSION SERIES
Discharge: HOME | End: 2025-08-08
Attending: INTERNAL MEDICINE
Payer: MEDICARE

## 2025-08-08 DIAGNOSIS — M81.0 AGE-RELATED OSTEOPOROSIS WITHOUT CURRENT PATHOLOGICAL FRACTURE: Primary | ICD-10-CM

## 2025-08-08 PROCEDURE — 63600000 HC DRUGS/DETAIL CODE: Mod: JZ,TB | Performed by: INTERNAL MEDICINE

## 2025-08-08 PROCEDURE — 96372 THER/PROPH/DIAG INJ SC/IM: CPT

## 2025-08-08 RX ORDER — FAMOTIDINE 10 MG/ML
20 INJECTION, SOLUTION INTRAVENOUS ONCE AS NEEDED
OUTPATIENT
Start: 2026-02-04

## 2025-08-08 RX ORDER — SODIUM CHLORIDE 9 MG/ML
500 INJECTION, SOLUTION INTRAVENOUS ONCE AS NEEDED
OUTPATIENT
Start: 2026-02-04

## 2025-08-08 RX ORDER — DIPHENHYDRAMINE HCL 50 MG/ML
25 VIAL (ML) INJECTION ONCE AS NEEDED
OUTPATIENT
Start: 2026-02-04

## 2025-08-08 RX ORDER — ALBUTEROL SULFATE 0.83 MG/ML
2.5 SOLUTION RESPIRATORY (INHALATION) ONCE AS NEEDED
OUTPATIENT
Start: 2026-02-04

## 2025-08-08 RX ADMIN — DENOSUMAB 60 MG: 60 INJECTION SUBCUTANEOUS at 14:32
